# Patient Record
Sex: FEMALE | Race: WHITE | Employment: PART TIME | URBAN - METROPOLITAN AREA
[De-identification: names, ages, dates, MRNs, and addresses within clinical notes are randomized per-mention and may not be internally consistent; named-entity substitution may affect disease eponyms.]

---

## 2017-05-30 ENCOUNTER — OFFICE VISIT (OUTPATIENT)
Dept: INTERNAL MEDICINE | Age: 32
End: 2017-05-30

## 2017-05-30 VITALS
DIASTOLIC BLOOD PRESSURE: 70 MMHG | BODY MASS INDEX: 20.83 KG/M2 | SYSTOLIC BLOOD PRESSURE: 100 MMHG | HEIGHT: 65 IN | WEIGHT: 125 LBS | HEART RATE: 85 BPM

## 2017-05-30 DIAGNOSIS — N76.6 VULVAR ULCER: Primary | ICD-10-CM

## 2017-05-30 DIAGNOSIS — R30.0 DYSURIA: ICD-10-CM

## 2017-05-30 LAB
BILIRUBIN, POC: NORMAL
BLOOD URINE, POC: NORMAL
CLARITY, POC: NORMAL
COLOR, POC: NORMAL
GLUCOSE URINE, POC: NORMAL
KETONES, POC: NORMAL
LEUKOCYTE EST, POC: NORMAL
NITRITE, POC: NORMAL
PH, POC: 6
PROTEIN, POC: NORMAL
SPECIFIC GRAVITY, POC: 1.01
UROBILINOGEN, POC: NORMAL

## 2017-05-30 PROCEDURE — 81002 URINALYSIS NONAUTO W/O SCOPE: CPT | Performed by: FAMILY MEDICINE

## 2017-05-30 PROCEDURE — 99202 OFFICE O/P NEW SF 15 MIN: CPT | Performed by: FAMILY MEDICINE

## 2017-05-30 ASSESSMENT — PATIENT HEALTH QUESTIONNAIRE - PHQ9
SUM OF ALL RESPONSES TO PHQ QUESTIONS 1-9: 0
1. LITTLE INTEREST OR PLEASURE IN DOING THINGS: 0
2. FEELING DOWN, DEPRESSED OR HOPELESS: 0
SUM OF ALL RESPONSES TO PHQ9 QUESTIONS 1 & 2: 0

## 2017-08-03 ENCOUNTER — HOSPITAL ENCOUNTER (OUTPATIENT)
Age: 32
Setting detail: SPECIMEN
Discharge: HOME | End: 2017-08-03
Payer: COMMERCIAL

## 2017-08-03 ENCOUNTER — OFFICE VISIT (OUTPATIENT)
Dept: INTERNAL MEDICINE | Age: 32
End: 2017-08-03

## 2017-08-03 VITALS
HEART RATE: 88 BPM | HEIGHT: 65 IN | WEIGHT: 121 LBS | BODY MASS INDEX: 20.16 KG/M2 | SYSTOLIC BLOOD PRESSURE: 118 MMHG | DIASTOLIC BLOOD PRESSURE: 60 MMHG

## 2017-08-03 DIAGNOSIS — J06.9 VIRAL URI: Primary | ICD-10-CM

## 2017-08-03 DIAGNOSIS — J02.9 SORE THROAT: ICD-10-CM

## 2017-08-03 DIAGNOSIS — J03.90 TONSILLITIS: ICD-10-CM

## 2017-08-03 LAB — S PYO AG THROAT QL: NORMAL

## 2017-08-03 PROCEDURE — 87880 STREP A ASSAY W/OPTIC: CPT | Performed by: NURSE PRACTITIONER

## 2017-08-03 PROCEDURE — 87070 CULTURE OTHR SPECIMN AEROBIC: CPT

## 2017-08-03 PROCEDURE — 99213 OFFICE O/P EST LOW 20 MIN: CPT | Performed by: NURSE PRACTITIONER

## 2017-08-03 RX ORDER — FLUTICASONE PROPIONATE 50 MCG
2 SPRAY, SUSPENSION (ML) NASAL DAILY
Qty: 1 BOTTLE | Refills: 0 | Status: SHIPPED | OUTPATIENT
Start: 2017-08-03 | End: 2017-09-27 | Stop reason: ALTCHOICE

## 2017-08-03 RX ORDER — CETIRIZINE HYDROCHLORIDE 10 MG/1
10 TABLET ORAL DAILY
Qty: 30 TABLET | Refills: 0 | Status: SHIPPED | OUTPATIENT
Start: 2017-08-03 | End: 2017-09-27 | Stop reason: ALTCHOICE

## 2017-08-03 ASSESSMENT — ENCOUNTER SYMPTOMS
BACK PAIN: 0
PHOTOPHOBIA: 0
VOMITING: 0
CONSTIPATION: 0
CHANGE IN BOWEL HABIT: 0
FACIAL SWEATING: 0
ABDOMINAL DISTENTION: 0
SCALP TENDERNESS: 0
SINUS PRESSURE: 0
NAUSEA: 0
SWOLLEN GLANDS: 1
COUGH: 0
DIARRHEA: 0
EYE DISCHARGE: 0
EYE WATERING: 0
EYE ITCHING: 0
EYE PAIN: 0
WHEEZING: 0
RHINORRHEA: 0
VISUAL CHANGE: 0
BLURRED VISION: 0
EYE REDNESS: 0
SORE THROAT: 1
ABDOMINAL PAIN: 0

## 2017-08-06 LAB — THROAT CULTURE: NORMAL

## 2017-09-27 ENCOUNTER — OFFICE VISIT (OUTPATIENT)
Dept: INTERNAL MEDICINE | Age: 32
End: 2017-09-27

## 2017-09-27 VITALS
WEIGHT: 116.6 LBS | BODY MASS INDEX: 19.43 KG/M2 | DIASTOLIC BLOOD PRESSURE: 70 MMHG | HEART RATE: 89 BPM | SYSTOLIC BLOOD PRESSURE: 100 MMHG | HEIGHT: 65 IN

## 2017-09-27 DIAGNOSIS — Z00.00 ANNUAL PHYSICAL EXAM: Primary | ICD-10-CM

## 2017-09-27 PROCEDURE — 99395 PREV VISIT EST AGE 18-39: CPT | Performed by: FAMILY MEDICINE

## 2017-09-27 ASSESSMENT — ENCOUNTER SYMPTOMS
EYE PAIN: 0
EYE DISCHARGE: 0
EYE ITCHING: 0
CHOKING: 0
CHEST TIGHTNESS: 0
APNEA: 0

## 2018-09-18 ENCOUNTER — OFFICE VISIT (OUTPATIENT)
Dept: INTERNAL MEDICINE | Age: 33
End: 2018-09-18
Payer: COMMERCIAL

## 2018-09-18 VITALS
HEART RATE: 103 BPM | WEIGHT: 118 LBS | SYSTOLIC BLOOD PRESSURE: 126 MMHG | OXYGEN SATURATION: 99 % | DIASTOLIC BLOOD PRESSURE: 75 MMHG | BODY MASS INDEX: 19.94 KG/M2

## 2018-09-18 DIAGNOSIS — Z00.00 ANNUAL PHYSICAL EXAM: Primary | ICD-10-CM

## 2018-09-18 PROCEDURE — 99395 PREV VISIT EST AGE 18-39: CPT | Performed by: FAMILY MEDICINE

## 2018-09-18 ASSESSMENT — PATIENT HEALTH QUESTIONNAIRE - PHQ9
1. LITTLE INTEREST OR PLEASURE IN DOING THINGS: 0
SUM OF ALL RESPONSES TO PHQ QUESTIONS 1-9: 0
2. FEELING DOWN, DEPRESSED OR HOPELESS: 0
SUM OF ALL RESPONSES TO PHQ9 QUESTIONS 1 & 2: 0
SUM OF ALL RESPONSES TO PHQ QUESTIONS 1-9: 0

## 2018-09-18 NOTE — PROGRESS NOTES
Patient: Adriana Lincoln    YOB: 1985    There are no active problems to display for this patient. No Known Allergies    Past Medical History:   Diagnosis Date    Pharyngitis, acute     Thyroid cyst      Past Surgical History:   Procedure Laterality Date    APPENDECTOMY       SECTION N/A     FOOT SURGERY      LEFT    THYROIDECTOMY, PARTIAL  2008    LEFT    WISDOM TOOTH EXTRACTION       Family History   Problem Relation Age of Onset    High Blood Pressure Mother     Other Mother         BRAIN TUMOR - benign     Allergies Father     Kidney Disease Other     High Blood Pressure Other     Heart Disease Other      Social History     Social History    Marital status:      Spouse name: N/A    Number of children: N/A    Years of education: N/A     Occupational History    Not on file. Social History Main Topics    Smoking status: Never Smoker    Smokeless tobacco: Never Used    Alcohol use No    Drug use: No      Comment: ossaional     Sexual activity: Yes     Partners: Male     Other Topics Concern    Not on file     Social History Narrative    No narrative on file     Current Outpatient Prescriptions on File Prior to Visit   Medication Sig Dispense Refill    Multiple Vitamin (MULTI-VITAMIN DAILY PO) Take 1 tablet by mouth daily. No current facility-administered medications on file prior to visit.         BP Readings from Last 4 Encounters:   18 126/75   17 100/70   17 118/60   17 100/70   ]  Wt Readings from Last 4 Encounters:   18 118 lb (53.5 kg)   17 116 lb 9.6 oz (52.9 kg)   17 121 lb (54.9 kg)   17 125 lb (56.7 kg)   ]    No components found for: HBA1C)]  No results found for: CHOL  No results found for: HDL  No components found for: LDL  No components found for: TG]    Chief Complaint   Patient presents with    Annual Exam     insurance  physical, no form or bw required       HPI - Annual Physical Exam      History   Alcohol Use No     History   Smoking Status    Never Smoker   Smokeless Tobacco    Never Used     History   Drug Use No     Comment: ossaional        OBGYN Hx:  No LMP recorded. OB History    Para Term  AB Living   2 2 2     2   SAB TAB Ectopic Molar Multiple Live Births             2      # Outcome Date GA Lbr Duran/2nd Weight Sex Delivery Anes PTL Lv   2 Term            1 Term                 History   Sexual Activity    Sexual activity: Yes    Partners: Male       Review of Systems  Constitutional: Negative for fatigue, fever and sweats. HEENT: Negative for eye discharge and vision loss. Negative for ear drainage, hearing loss and nasal drainage. Respiratory: Negative for cough, dyspnea and wheezing. Cardiovascular:  Negative for chest pain, claudication and irregular heartbeat/palpitations. Physical Exam  Vitals:    18 1010   BP: 126/75   Pulse: 103   SpO2: 99%   Body mass index is 19.94 kg/m². Physical Exam  Constitutional: appears well-developed and well-nourished. No distress. HENT:   Head: Normocephalic and atraumatic. Right Ear: Tympanic membrane, external ear and ear canal normal.   Left Ear: Tympanic membrane, external ear and ear canal normal.   Nose: Nose normal.   Mouth/Throat: Oropharynx is clear and moist. No oropharyngeal exudate. Eyes: Conjunctivae and EOM are normal. Right eye exhibits no discharge. No scleral icterus. Neck: Normal range of motion. Neck supple. Cardiovascular: Normal rate, regular rhythm and normal heart sounds. Pulmonary/Chest: Effort normal and breath sounds normal. No respiratory distress. no wheezes. no rales. Abd: soft NT ND  Lymphadenopathy:      no cervical adenopathy. Skin:  not diaphoretic.    Gait Normal  LE: no edema  Nursing note and vitals reviewed.       Assessment:  Marc Johnson was seen today for annual exam.    Diagnoses and all orders for this visit:    Annual physical exam      Return in

## 2019-01-14 ENCOUNTER — OFFICE VISIT (OUTPATIENT)
Dept: INTERNAL MEDICINE | Age: 34
End: 2019-01-14
Payer: COMMERCIAL

## 2019-01-14 VITALS
SYSTOLIC BLOOD PRESSURE: 110 MMHG | HEART RATE: 84 BPM | DIASTOLIC BLOOD PRESSURE: 68 MMHG | OXYGEN SATURATION: 96 % | BODY MASS INDEX: 21.68 KG/M2 | TEMPERATURE: 98.2 F | WEIGHT: 127 LBS | HEIGHT: 64 IN

## 2019-01-14 DIAGNOSIS — J02.9 PHARYNGITIS, UNSPECIFIED ETIOLOGY: ICD-10-CM

## 2019-01-14 DIAGNOSIS — J02.9 SORE THROAT: Primary | ICD-10-CM

## 2019-01-14 LAB — S PYO AG THROAT QL: NORMAL

## 2019-01-14 PROCEDURE — 87880 STREP A ASSAY W/OPTIC: CPT | Performed by: NURSE PRACTITIONER

## 2019-01-14 PROCEDURE — 99213 OFFICE O/P EST LOW 20 MIN: CPT | Performed by: NURSE PRACTITIONER

## 2019-01-14 RX ORDER — PANTOPRAZOLE SODIUM 40 MG/1
TABLET, DELAYED RELEASE ORAL
Refills: 0 | COMMUNITY
Start: 2018-12-21 | End: 2021-12-09

## 2019-01-14 ASSESSMENT — ENCOUNTER SYMPTOMS
RHINORRHEA: 0
SORE THROAT: 1
VOMITING: 0
DIARRHEA: 0
APNEA: 0
SINUS PRESSURE: 0
NAUSEA: 0
COUGH: 0
SINUS PAIN: 0

## 2019-01-15 ENCOUNTER — TELEPHONE (OUTPATIENT)
Dept: INTERNAL MEDICINE | Age: 34
End: 2019-01-15

## 2021-12-09 ENCOUNTER — VIRTUAL VISIT (OUTPATIENT)
Dept: FAMILY MEDICINE CLINIC | Age: 36
End: 2021-12-09
Payer: COMMERCIAL

## 2021-12-09 DIAGNOSIS — B96.89 ACUTE BACTERIAL SINUSITIS: Primary | ICD-10-CM

## 2021-12-09 DIAGNOSIS — J01.90 ACUTE BACTERIAL SINUSITIS: Primary | ICD-10-CM

## 2021-12-09 PROCEDURE — 99213 OFFICE O/P EST LOW 20 MIN: CPT | Performed by: FAMILY MEDICINE

## 2021-12-09 RX ORDER — AMOXICILLIN AND CLAVULANATE POTASSIUM 875; 125 MG/1; MG/1
1 TABLET, FILM COATED ORAL 2 TIMES DAILY
Qty: 20 TABLET | Refills: 0 | Status: SHIPPED | OUTPATIENT
Start: 2021-12-09 | End: 2021-12-19

## 2021-12-09 RX ORDER — FLUTICASONE PROPIONATE 50 MCG
2 SPRAY, SUSPENSION (ML) NASAL DAILY
Qty: 48 G | Refills: 1 | Status: SHIPPED | OUTPATIENT
Start: 2021-12-09

## 2021-12-09 RX ORDER — IBUPROFEN 200 MG
200 TABLET ORAL EVERY 6 HOURS PRN
COMMUNITY

## 2021-12-09 SDOH — ECONOMIC STABILITY: FOOD INSECURITY: WITHIN THE PAST 12 MONTHS, YOU WORRIED THAT YOUR FOOD WOULD RUN OUT BEFORE YOU GOT MONEY TO BUY MORE.: NEVER TRUE

## 2021-12-09 SDOH — ECONOMIC STABILITY: FOOD INSECURITY: WITHIN THE PAST 12 MONTHS, THE FOOD YOU BOUGHT JUST DIDN'T LAST AND YOU DIDN'T HAVE MONEY TO GET MORE.: NEVER TRUE

## 2021-12-09 ASSESSMENT — SOCIAL DETERMINANTS OF HEALTH (SDOH): HOW HARD IS IT FOR YOU TO PAY FOR THE VERY BASICS LIKE FOOD, HOUSING, MEDICAL CARE, AND HEATING?: NOT HARD AT ALL

## 2021-12-09 ASSESSMENT — ENCOUNTER SYMPTOMS
SINUS PAIN: 1
CONSTIPATION: 0
ABDOMINAL PAIN: 0
SHORTNESS OF BREATH: 0
SINUS PRESSURE: 1
SORE THROAT: 0
DIARRHEA: 0
WHEEZING: 0
RHINORRHEA: 0
COUGH: 0

## 2021-12-09 ASSESSMENT — PATIENT HEALTH QUESTIONNAIRE - PHQ9
SUM OF ALL RESPONSES TO PHQ QUESTIONS 1-9: 0
SUM OF ALL RESPONSES TO PHQ QUESTIONS 1-9: 0
SUM OF ALL RESPONSES TO PHQ9 QUESTIONS 1 & 2: 0
2. FEELING DOWN, DEPRESSED OR HOPELESS: 0
1. LITTLE INTEREST OR PLEASURE IN DOING THINGS: 0
SUM OF ALL RESPONSES TO PHQ QUESTIONS 1-9: 0

## 2021-12-09 NOTE — PROGRESS NOTES
[facility]  Virtual Visit  [address] [facility] [address] [facility] PRIMARY CARE  [id number]  Dept: [phone]  Dept Fax: : [phone]     Due to COVID 23 outbreak, patient's office visit was converted to a virtual visit. Patient was contacted and agreed to proceed with a virtual visit via Doxy. me  The risks and benefits of converting to a virtual visit were discussed in light of the current infectious disease epidemic. Patient also understood that insurance coverage and co-pays are up to their individual insurance plans. Chief Complaint  Chief Complaint   Patient presents with    Positive For Covid-19     x8 days, dx 21, congestion, sinus pressure, no smell or taste, cough       HPI:  31 y. o.female who presents for the following:      Started symptoms  and pos for COVID ; still unable to taste/smell, still has congestion, face pressure; temp to 100.4 last week but not lately; taking tylenol, ibuprofen, claritin, afrin twice, saline nasal spray, gamaliel pot; Hx of SVT and gets intermittent palpitations so worries about otc meds or decongestants    Review of Systems   Constitutional: Negative for chills and fever. HENT: Positive for sinus pressure and sinus pain. Negative for congestion, rhinorrhea and sore throat. Respiratory: Negative for cough, shortness of breath and wheezing. Gastrointestinal: Negative for abdominal pain, constipation and diarrhea. Endocrine: Negative for polydipsia and polyuria. Genitourinary: Negative for dysuria, frequency and urgency. Neurological: Negative for syncope, light-headedness, numbness and headaches. Psychiatric/Behavioral: Negative for sleep disturbance. The patient is not nervous/anxious.         Past Medical History:   Diagnosis Date    Pharyngitis, acute     Thyroid cyst      Past Surgical History:   Procedure Laterality Date    APPENDECTOMY       SECTION N/A 02/12    FOOT SURGERY      LEFT    THYROIDECTOMY, PARTIAL  12/2008    LEFT    WISDOM TOOTH EXTRACTION       Social History     Socioeconomic History    Marital status:      Spouse name: Not on file    Number of children: Not on file    Years of education: Not on file    Highest education level: Not on file   Occupational History    Not on file   Tobacco Use    Smoking status: Never Smoker    Smokeless tobacco: Never Used   Substance and Sexual Activity    Alcohol use: No    Drug use: No     Comment: ossaional     Sexual activity: Yes     Partners: Male   Other Topics Concern    Not on file   Social History Narrative    Not on file     Social Determinants of Health     Financial Resource Strain: Low Risk     Difficulty of Paying Living Expenses: Not hard at all   Food Insecurity: No Food Insecurity    Worried About [employer] in the Last Year: Never true    [address] [place] in the Last Year: Never true   Transportation Needs:     Lack of Transportation (Medical): Not on file    Lack of Transportation (Non-Medical):  Not on file   Physical Activity:     Days of Exercise per Week: Not on file    Minutes of Exercise per Session: Not on file   Stress:     Feeling of Stress : Not on file   Social Connections:     Frequency of Communication with Friends and Family: Not on file    Frequency of Social Gatherings with Friends and Family: Not on file    Attends Protestant Services: Not on file    Active Member of Clubs or Organizations: Not on file    Attends Club or Organization Meetings: Not on file    Marital Status: Not on file   Intimate Partner Violence:     Fear of Current or Ex-Partner: Not on file    Emotionally Abused: Not on file    Physically Abused: Not on file    Sexually Abused: Not on file   Housing Stability:     Unable to Pay for Housing in the Last Year: Not on file    Number of [place] in the Last Year: Not on file    Unstable Housing in the Last Year: Not on file     Family History   Problem Relation Age of Onset    High Blood Pressure Mother     Other Mother         BRAIN TUMOR - benign     Allergies Father     Kidney Disease Other     High Blood Pressure Other     Heart Disease Other       No Known Allergies  Current Outpatient Medications   Medication Sig Dispense Refill    Multiple Vitamin (MULTIVITAMINS PO) Take by mouth      Acetaminophen (TYLENOL 8 HOUR PO) Take by mouth      ibuprofen (ADVIL;MOTRIN) 200 MG tablet Take 200 mg by mouth every 6 hours as needed for Pain      fluticasone (FLONASE) 50 MCG/ACT nasal spray 2 sprays by Each Nostril route daily 48 g 1    amoxicillin-clavulanate (AUGMENTIN) 875-125 MG per tablet Take 1 tablet by mouth 2 times daily for 10 days 20 tablet 0     No current facility-administered medications for this visit. Physical exam:  Physical Exam  Constitutional:       General: She is not in acute distress. Appearance: Normal appearance. She is not ill-appearing. HENT:      Head: Normocephalic and atraumatic. Pulmonary:      Effort: Pulmonary effort is normal. No respiratory distress. Musculoskeletal:      Cervical back: Normal range of motion. Neurological:      Mental Status: She is alert. Assessment/Plan:  31 y.o. female here mainly for Sinus pain:  - limited on options; discussed warnings on afrin; starting nasal steroid; provided augmentin to use later if pain and pressure increase; her symptoms are pretty focal on the maxillary area b/l     Diagnosis Orders   1. Acute bacterial sinusitis  fluticasone (FLONASE) 50 MCG/ACT nasal spray    amoxicillin-clavulanate (AUGMENTIN) 875-125 MG per tablet        Return if symptoms worsen or fail to improve.     Shon Fabian MD       Pursuant to the emergency declaration under the [facility], [address] [place] [address] and the [employer] General Act, this Virtual Visit was conducted, with patient's consent, to reduce the patient's risk of exposure to COVID-19 and provide continuity of care for an established patient. Services were provided through a video synchronous discussion virtually to substitute for in-person clinic visit.

## 2023-12-18 PROBLEM — I47.11 INAPPROPRIATE SINUS TACHYCARDIA (CMS-HCC): Status: ACTIVE | Noted: 2023-12-18

## 2023-12-18 PROBLEM — I47.10 SUPRAVENTRICULAR TACHYCARDIA (CMS-HCC): Status: ACTIVE | Noted: 2023-12-18

## 2023-12-18 RX ORDER — LANOLIN ALCOHOL/MO/W.PET/CERES
400 CREAM (GRAM) TOPICAL DAILY
COMMUNITY
Start: 2022-09-16 | End: 2024-03-01 | Stop reason: WASHOUT

## 2024-01-09 ENCOUNTER — ANCILLARY PROCEDURE (OUTPATIENT)
Dept: RADIOLOGY | Facility: CLINIC | Age: 39
End: 2024-01-09
Payer: COMMERCIAL

## 2024-01-09 DIAGNOSIS — M25.532 WRIST PAIN, LEFT: ICD-10-CM

## 2024-01-09 PROCEDURE — 73110 X-RAY EXAM OF WRIST: CPT | Mod: LEFT SIDE | Performed by: RADIOLOGY

## 2024-01-09 PROCEDURE — 73110 X-RAY EXAM OF WRIST: CPT | Mod: LT

## 2024-01-19 ENCOUNTER — APPOINTMENT (OUTPATIENT)
Dept: CARDIOLOGY | Facility: CLINIC | Age: 39
End: 2024-01-19
Payer: COMMERCIAL

## 2024-03-01 ENCOUNTER — OFFICE VISIT (OUTPATIENT)
Dept: CARDIOLOGY | Facility: CLINIC | Age: 39
End: 2024-03-01
Payer: COMMERCIAL

## 2024-03-01 VITALS
DIASTOLIC BLOOD PRESSURE: 78 MMHG | SYSTOLIC BLOOD PRESSURE: 124 MMHG | WEIGHT: 128 LBS | HEIGHT: 64 IN | BODY MASS INDEX: 21.85 KG/M2 | HEART RATE: 75 BPM

## 2024-03-01 DIAGNOSIS — Z71.89 ENCOUNTER FOR MEDICATION REVIEW AND COUNSELING: ICD-10-CM

## 2024-03-01 DIAGNOSIS — I47.10 SUPRAVENTRICULAR TACHYCARDIA (CMS-HCC): Primary | ICD-10-CM

## 2024-03-01 DIAGNOSIS — I47.11 INAPPROPRIATE SINUS TACHYCARDIA (CMS-HCC): ICD-10-CM

## 2024-03-01 DIAGNOSIS — Z71.89 ENCOUNTER TO DISCUSS TREATMENT OPTIONS: ICD-10-CM

## 2024-03-01 DIAGNOSIS — Z78.9 NEVER SMOKED CIGARETTES: ICD-10-CM

## 2024-03-01 PROBLEM — N97.9 FEMALE INFERTILITY: Status: ACTIVE | Noted: 2024-03-01

## 2024-03-01 PROCEDURE — 3008F BODY MASS INDEX DOCD: CPT | Performed by: INTERNAL MEDICINE

## 2024-03-01 PROCEDURE — 99213 OFFICE O/P EST LOW 20 MIN: CPT | Performed by: INTERNAL MEDICINE

## 2024-03-01 PROCEDURE — 93000 ELECTROCARDIOGRAM COMPLETE: CPT | Performed by: INTERNAL MEDICINE

## 2024-03-01 PROCEDURE — 1036F TOBACCO NON-USER: CPT | Performed by: INTERNAL MEDICINE

## 2024-03-01 RX ORDER — BISMUTH SUBSALICYLATE 262 MG
1 TABLET,CHEWABLE ORAL DAILY
COMMUNITY

## 2024-03-01 ASSESSMENT — ENCOUNTER SYMPTOMS
PALPITATIONS: 1
DYSPNEA ON EXERTION: 0

## 2024-03-01 NOTE — PROGRESS NOTES
"Chief Complaint:   Follow-up (Patient presented today for a 1 year follow up./EKG done in office today./)     History Of Present Illness:    Gillian Mcdonald is a 38 y.o. female presenting with followup.    She rarely has palpitation.   She denies any lightheadedness, near syncope, syncope, or dizziness.,         Last Recorded Vitals:  Vitals:    03/01/24 1533   BP: 124/78   BP Location: Left arm   Patient Position: Sitting   BP Cuff Size: Small adult   Pulse: 75   Weight: 58.1 kg (128 lb)   Height: 1.613 m (5' 3.5\")       Past Medical History:  See List     Past Surgical History:  See List       Social History:  She reports that she has never smoked. She has never used smokeless tobacco. She reports current alcohol use. She reports that she does not use drugs.    Family History:  Family History   Problem Relation Name Age of Onset    Hypertension Mother      Hypertension Maternal Grandfather      Other (atrial septal defect) Son          Allergies:  Patient has no known allergies.    Outpatient Medications:  Current Outpatient Medications   Medication Instructions    multivitamin tablet 1 tablet, oral, Daily   Review of Systems   Cardiovascular:  Positive for palpitations. Negative for chest pain and dyspnea on exertion.   All other systems reviewed and are negative.    Physical Exam:  Constitutional:       General: Awake.      Appearance: Normal and healthy appearance. Well-developed and not in distress.   Neck:      Vascular: No JVR. JVD normal.   Pulmonary:      Effort: Pulmonary effort is normal.      Breath sounds: Normal breath sounds. No wheezing. No rhonchi. No rales.   Chest:      Chest wall: Not tender to palpatation.   Cardiovascular:      PMI at left midclavicular line. Normal rate. Regular rhythm. Normal S1. Normal S2.       Murmurs: There is no murmur.      No gallop.  No click. No rub.   Pulses:     Intact distal pulses.   Edema:     Peripheral edema absent.   Abdominal:      Tenderness: " "There is no abdominal tenderness.   Musculoskeletal: Normal range of motion.         General: No tenderness. Skin:     General: Skin is warm and dry.   Neurological:      General: No focal deficit present.      Mental Status: Alert and oriented to person, place and time.            Last Labs:    No results found for: \"WBC\", \"HGB\", \"HCT\", \"MCV\", \"PLT\"   No results found for: \"GLUCOSE\", \"CALCIUM\", \"NA\", \"K\", \"CO2\", \"CL\", \"BUN\", \"CREATININE\"   No results found for: \"INR\", \"PROTIME\"         Last Cardiology Tests:  ECG:  Today. NSR. Normal axis. Qtc 410 ms        Lab review: I have personally reviewed the laboratory result(s) see above    Assessment/Plan   Diagnoses and all orders for this visit:  Supraventricular tachycardia  Inappropriate sinus tachycardia  Never smoked cigarettes  Encounter for medication review and counseling  Encounter to discuss treatment options  BMI 22.0-22.9, adult        Yue Trujillo RN    Inappropriate sinus tachycardia. Chronic. Stable. Reviewed meds. Continue meds. Refills. If increased symptoms, would obtain 24-hour monitor.   History of exertional dyspnea. Resolved. Normal LV function and stress echo in remote past. Normal MRI. No further evaluation indicated presently.  s/p partial thyroidectomy  No caffeine intake  FMH of ASD, CAD, and AF. No new family medical history        AHA recommendations for exercise, diet, and behavioral modification reviewed with pt.     The patient and I discussed the mechanism of arrhythmia, ECG, inappropriate sinus tachycardia, historical triggers, aerobic training, indications for and types of medications, discussion if and what medication refills needed, increased aerobic activity, increase fluid intake, avoid dehydration, treatment options, risks, benefits, and imponderables. American Heart Association lifestyle changes and behavioral modification discussed. All questions answered in detail. Counseling over 50% visit regarding above. Patient " appreciative of care.

## 2024-06-19 ENCOUNTER — APPOINTMENT (OUTPATIENT)
Dept: INTEGRATIVE MEDICINE | Facility: CLINIC | Age: 39
End: 2024-06-19
Payer: COMMERCIAL

## 2024-06-19 VITALS
SYSTOLIC BLOOD PRESSURE: 125 MMHG | OXYGEN SATURATION: 99 % | WEIGHT: 123 LBS | HEIGHT: 63 IN | DIASTOLIC BLOOD PRESSURE: 82 MMHG | HEART RATE: 71 BPM | BODY MASS INDEX: 21.79 KG/M2

## 2024-06-19 DIAGNOSIS — I47.11 INAPPROPRIATE SINUS TACHYCARDIA (CMS-HCC): Primary | ICD-10-CM

## 2024-06-19 DIAGNOSIS — R53.83 OTHER FATIGUE: ICD-10-CM

## 2024-06-19 DIAGNOSIS — E89.0 H/O PARTIAL THYROIDECTOMY: ICD-10-CM

## 2024-06-19 DIAGNOSIS — N92.0 MENORRHAGIA WITH REGULAR CYCLE: ICD-10-CM

## 2024-06-19 DIAGNOSIS — N97.9 FEMALE INFERTILITY: ICD-10-CM

## 2024-06-19 PROCEDURE — 99205 OFFICE O/P NEW HI 60 MIN: CPT | Performed by: INTERNAL MEDICINE

## 2024-06-19 ASSESSMENT — ENCOUNTER SYMPTOMS
ARTHRALGIAS: 0
APPETITE CHANGE: 0
WEAKNESS: 0
FEVER: 0
FATIGUE: 1
HEADACHES: 1
DYSURIA: 0
CONSTIPATION: 0
SHORTNESS OF BREATH: 0
MYALGIAS: 0
LIGHT-HEADEDNESS: 1
BACK PAIN: 0
COUGH: 0
NERVOUS/ANXIOUS: 0
DIFFICULTY URINATING: 0
DIARRHEA: 0
SLEEP DISTURBANCE: 1

## 2024-06-19 NOTE — PROGRESS NOTES
Integrative Medicine Visit:     Subjective   Patient ID: Gillian Mcdonald is a 39 y.o. female who presents for hormones (Wendi menopause)       HPI  Having super heavy periods.  She is trying to determine if she is starting menopause. She has been to her gyn and they recommend birth control pills.  Was on ocp years ago 13 years ago for menorrhagia.  Since birth of her daughter she has had very heavy periods.  Last period was 13 days ago but still waking up in middle of the ngiht. Feels hot a lot at night.   Periods; super heavy.  Happens every month from 26-30 days (period cycle length).  2nd day very heavy with large clots. Goes throurgh an ultra tampon in an hour and a half., not sure if she is anemic. No labs in years. Takes a multiple vitamin with iron.     Gets irritable when she has her periods.    No constipation or diarrhea.   Used to be on meds for headachaes- notices it is from dehydration or lack of sleep. Mostly gets them around her period.     Takes a magnesium / vitamin c suppelenmt from plexus  Takes a prebiotic supplement also.   Heart palpitations- followed by cardiologist.  Suggested to exercise daily.   CONCERNS:  wants to know if she is going through menopause. Does not want to go through birth control.     PMH:  heart palpitations- sees a cardiologist- not on meds for this.  Advised to exercise daily.   Thyroid nodule- small removal of part of the thyroid. Yearly thyroid labs.   Hx of iron deficiency anemia as a child- on supplements.   Infertility    [facility]:  hypertension, high cholesterol. Mom with thyroid cancer.   Grandpa with pancreatic cancer.    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 Two kids . ( has had vasectomy and they are no longer trying to have additional children) kids ages 12 , 7.     NUTRITION: egg with avocado and toast water  Tacos- beef lettuce, tomatoes, sour cream, guacomole  Snacks: not sure; almonds, yogurt, cucumbers, peppers,  "chips, lunch meat  Bagel for breakfast laughing cow cheese  Lunch: leftovers- egg roll in a bowl, cabbage, ground beef, soy sauce and rice noodles.     Smoking:  non-smoker    Alcohol use:   maybe has 1-2 drinks per week.     Exercise:   20-30 minutes of spin bike five days per week or elliptical  or walking;  does some weights but sporadically-     SLEEP: wakes up at least once per night. Has night sweats. No hot flashes during the day.     STRESS MANAGEMENT: gardening, go on the elliptical, likes to read  SUPPORT: , good friends that she talks to. Sister    Review of Systems   Constitutional:  Positive for fatigue. Negative for appetite change and fever.   HENT:  Negative for congestion and hearing loss.    Eyes:  Negative for visual disturbance.   Respiratory:  Negative for cough and shortness of breath.    Cardiovascular:  Negative for chest pain and leg swelling.   Gastrointestinal:  Negative for constipation and diarrhea.   Genitourinary:  Negative for difficulty urinating, dysuria and urgency.   Musculoskeletal:  Negative for arthralgias, back pain and myalgias.   Skin:  Negative for rash.   Neurological:  Positive for light-headedness (when she stands sometimes.) and headaches. Negative for weakness.   Psychiatric/Behavioral:  Positive for sleep disturbance. Negative for behavioral problems. The patient is not nervous/anxious.             Pain:    Objective   /82 (BP Location: Left arm, Patient Position: Sitting, BP Cuff Size: Adult)   Pulse 71   Ht 1.6 m (5' 3\")   Wt 55.8 kg (123 lb)   SpO2 99%   BMI 21.79 kg/m²       Physical Exam  HENT:      Head: Normocephalic and atraumatic.      Mouth/Throat:      Mouth: Mucous membranes are moist.   Cardiovascular:      Rate and Rhythm: Normal rate.   Pulmonary:      Effort: Pulmonary effort is normal. No respiratory distress.   Musculoskeletal:         General: No swelling or deformity.      Cervical back: Normal range of motion.   Skin:     " General: Skin is dry.      Findings: No rash.   Neurological:      General: No focal deficit present.      Mental Status: She is alert and oriented to person, place, and time.   Psychiatric:      Comments: Normal affect                      Assessment/Plan     Problem List Items Addressed This Visit             ICD-10-CM    Inappropriate sinus tachycardia (CMS-HCC) - Primary I47.11    Relevant Orders    Thyroid Stimulating Hormone    Thyroxine, Free    Female infertility N97.9    Relevant Orders    Celiac Panel    Referral to [facility]     Other Visit Diagnoses         Codes    H/O partial thyroidectomy     E89.0    Relevant Orders    Thyroid Stimulating Hormone    Thyroxine, Free    Iodine, Urine    Other fatigue     R53.83    Relevant Orders    Iron and TIBC    Ferritin    CBC and Auto Differential    Celiac Panel    Vitamin B12    Referral to [facility]    Menorrhagia with regular cycle     N92.0    Relevant Orders    Iron and TIBC    Ferritin    CBC and Auto Differential    Thyroid Stimulating Hormone    Thyroxine, Free    Vitamin B12    Iodine, Urine    Referral to [facility]          Patient with heavy somewhat uncomfortable periods which have worsened since birth of second child. Pelvic ultrasound without fibroid from 3 years ago. Heavy blood clots. Hx of partial thyroidectomy. Suggest recheck labs.  May need to see gyn to make sure no new fibroids. Discussed cycle of estrogen/ progesterone and how higher fiber diet can help balance out hormones. See patient instructions for details. Limit dairy in diet which is exogenous source of estrogen/ progesterone.   Consider acupucnture with Dr. Valdemar Dorsey for irregular periods.   Screen for celiac given hx of infertility.   See patient instructions.   Recommend Follow up in : 2 months    Corrie Ponce MD PhD    Time Spent  Prep time on day of patient encounter: 5 minutes  Time spent directly with patient, family or caregiver: 55  minutes  Additional Time Spent on Patient Care Activities: 0 minutes  Documentation Time: 5 minutes  Other Time Spent: 0 minutes  Total: 65 minutes

## 2024-06-19 NOTE — PATIENT INSTRUCTIONS
See if reducing alcohol helps with night sweats and bleeding.   From your diet, strive to get 30 grams of fiber per day.   Start to incorporate ground flax seed.   Use Flax eggs instead of regular eggs for baking. 1 tablespoon of ground flax seed mixed with 3 tablespoons of warm water. Set aside for ten minutes then use as an egg in baking.    Incorporate soy products may help.   Start to eat more beans.   Recommend taking vitamin D 2000 International Units daily including what is in your multiple. You need about 3100 units of vitamin d daily to maintain optimal levels. Recommend check level in the winter on the supplement.   Reduce eggs and dairy in the diet.   Consider seeing Dr. More mcdaniel.   Jason Berry supplement by parvin. Could try this for six months to see this helps your periods.   Skip your MVI for three days and then get the labs.   Strive to exercise 30 minutes daily where you heart rate goes above 100 and try to do some strength each week.   Check iodine level in your mvi.   Follow up in 2 months.

## 2024-06-24 ENCOUNTER — LAB (OUTPATIENT)
Dept: LAB | Facility: LAB | Age: 39
End: 2024-06-24
Payer: COMMERCIAL

## 2024-06-24 DIAGNOSIS — R53.83 OTHER FATIGUE: ICD-10-CM

## 2024-06-24 DIAGNOSIS — N92.0 MENORRHAGIA WITH REGULAR CYCLE: ICD-10-CM

## 2024-06-24 DIAGNOSIS — I47.11 INAPPROPRIATE SINUS TACHYCARDIA (CMS-HCC): ICD-10-CM

## 2024-06-24 DIAGNOSIS — N97.9 FEMALE INFERTILITY: ICD-10-CM

## 2024-06-24 DIAGNOSIS — E89.0 H/O PARTIAL THYROIDECTOMY: ICD-10-CM

## 2024-06-24 LAB
BASOPHILS # BLD AUTO: 0.05 X10*3/UL (ref 0–0.1)
BASOPHILS NFR BLD AUTO: 0.9 %
EOSINOPHIL # BLD AUTO: 0.09 X10*3/UL (ref 0–0.7)
EOSINOPHIL NFR BLD AUTO: 1.6 %
ERYTHROCYTE [DISTWIDTH] IN BLOOD BY AUTOMATED COUNT: 13.1 % (ref 11.5–14.5)
FERRITIN SERPL-MCNC: 29 NG/ML (ref 8–150)
HCT VFR BLD AUTO: 41.2 % (ref 36–46)
HGB BLD-MCNC: 13.9 G/DL (ref 12–16)
IMM GRANULOCYTES # BLD AUTO: 0.01 X10*3/UL (ref 0–0.7)
IMM GRANULOCYTES NFR BLD AUTO: 0.2 % (ref 0–0.9)
IRON SATN MFR SERPL: 12 % (ref 25–45)
IRON SERPL-MCNC: 55 UG/DL (ref 35–150)
LYMPHOCYTES # BLD AUTO: 1.88 X10*3/UL (ref 1.2–4.8)
LYMPHOCYTES NFR BLD AUTO: 33.3 %
MCH RBC QN AUTO: 29.1 PG (ref 26–34)
MCHC RBC AUTO-ENTMCNC: 33.7 G/DL (ref 32–36)
MCV RBC AUTO: 86 FL (ref 80–100)
MONOCYTES # BLD AUTO: 0.43 X10*3/UL (ref 0.1–1)
MONOCYTES NFR BLD AUTO: 7.6 %
NEUTROPHILS # BLD AUTO: 3.18 X10*3/UL (ref 1.2–7.7)
NEUTROPHILS NFR BLD AUTO: 56.4 %
NRBC BLD-RTO: 0 /100 WBCS (ref 0–0)
PLATELET # BLD AUTO: 226 X10*3/UL (ref 150–450)
RBC # BLD AUTO: 4.78 X10*6/UL (ref 4–5.2)
T4 FREE SERPL-MCNC: 0.85 NG/DL (ref 0.61–1.12)
TIBC SERPL-MCNC: 443 UG/DL (ref 240–445)
TSH SERPL-ACNC: 2.11 MIU/L (ref 0.44–3.98)
UIBC SERPL-MCNC: 388 UG/DL (ref 110–370)
VIT B12 SERPL-MCNC: 449 PG/ML (ref 211–911)
WBC # BLD AUTO: 5.6 X10*3/UL (ref 4.4–11.3)

## 2024-06-24 PROCEDURE — 83540 ASSAY OF IRON: CPT

## 2024-06-24 PROCEDURE — 36415 COLL VENOUS BLD VENIPUNCTURE: CPT

## 2024-06-24 PROCEDURE — 84439 ASSAY OF FREE THYROXINE: CPT

## 2024-06-24 PROCEDURE — 83516 IMMUNOASSAY NONANTIBODY: CPT

## 2024-06-24 PROCEDURE — 85025 COMPLETE CBC W/AUTO DIFF WBC: CPT

## 2024-06-24 PROCEDURE — 82728 ASSAY OF FERRITIN: CPT

## 2024-06-24 PROCEDURE — 83018 HEAVY METAL QUAN EACH NES: CPT

## 2024-06-24 PROCEDURE — 82607 VITAMIN B-12: CPT

## 2024-06-24 PROCEDURE — 83550 IRON BINDING TEST: CPT

## 2024-06-24 PROCEDURE — 84443 ASSAY THYROID STIM HORMONE: CPT

## 2024-06-25 LAB
GLIADIN PEPTIDE IGA SER IA-ACNC: <1 U/ML
TTG IGA SER IA-ACNC: <1 U/ML

## 2024-06-26 LAB
GLIADIN PEPTIDE IGG SER IA-ACNC: <0.56 FLU (ref 0–4.99)
TTG IGG SER IA-ACNC: <0.82 FLU (ref 0–4.99)

## 2024-07-01 LAB
COLLECT DURATION TIME SPEC: NORMAL HR
CREAT 24H UR-MRATE: NORMAL MG/D (ref 700–1600)
CREAT UR-MCNC: 13 MG/DL
IODINE 24H UR-MCNC: 28.2 UG/L (ref 26–705)
IODINE 24H UR-MRATE: NORMAL MG/(24.H) (ref 93–1125)
IODINE/CREAT UR: 216.9 UG/G CRT (ref 35–540)
SPECIMEN VOL ?TM UR: NORMAL ML

## 2024-08-14 ENCOUNTER — APPOINTMENT (OUTPATIENT)
Dept: INTEGRATIVE MEDICINE | Facility: CLINIC | Age: 39
End: 2024-08-14
Payer: COMMERCIAL

## 2024-08-14 VITALS
SYSTOLIC BLOOD PRESSURE: 117 MMHG | OXYGEN SATURATION: 99 % | BODY MASS INDEX: 21.62 KG/M2 | WEIGHT: 122 LBS | DIASTOLIC BLOOD PRESSURE: 76 MMHG | HEART RATE: 91 BPM | HEIGHT: 63 IN

## 2024-08-14 DIAGNOSIS — N92.0 MENORRHAGIA WITH REGULAR CYCLE: Primary | ICD-10-CM

## 2024-08-14 PROCEDURE — 99214 OFFICE O/P EST MOD 30 MIN: CPT | Performed by: INTERNAL MEDICINE

## 2024-08-14 ASSESSMENT — ENCOUNTER SYMPTOMS
HEADACHES: 1
FEVER: 0
SHORTNESS OF BREATH: 0
ARTHRALGIAS: 0
FATIGUE: 1
LIGHT-HEADEDNESS: 1
APPETITE CHANGE: 0
BACK PAIN: 0
DIFFICULTY URINATING: 0
ABDOMINAL DISTENTION: 1
MYALGIAS: 0
WEAKNESS: 0
SLEEP DISTURBANCE: 1
DIARRHEA: 0
CONSTIPATION: 0
NERVOUS/ANXIOUS: 0
COUGH: 0
DYSURIA: 0

## 2024-08-14 NOTE — PATIENT INSTRUCTIONS
Keep working on exercise. Perhaps incorporate strength. More frequently if you can.    Check your protein powder, suggest plant based.   Start the vitamin D 2000 International Units daily  Continue the same supplements.  Try tofu.   Follow up in 3 months.   Corrie Ponce MD PhD

## 2024-08-14 NOTE — PROGRESS NOTES
Integrative Medicine Follow-up Visit :     Subjective   Patient ID: Gillian Mcdonald is a 39 y.o. female who presents for Follow-up       HPI  She is doing better with incorporating more fiber. Has tried to add flax / oatmeal but getting bloated.  Having soy milk with fruit, raspberries, blueberries. Getting apples.   When she was first started on more fiber, she flet like her energy is improved.   Busy but energy is better.   Goes to bed around 9:30 and wakes at 5:15 am. Hard to go to bed earlier.     Started the vitamin b12 in the morning.   Last month noticed that her period was a bit lighter. This month her period was heavy but not as bad. Did not last as long.   Takes iron three days per week.     Incorporating more beans and lentils.    Exercising has been regular but she finds it hard to keep doing it. Spin bike or elliptical. Doing more weights with squats and band.     Night sweats are perhaps better.  Not getting lightheadedness.  Taking the iron.   Held off on getting acupuncture because wanted to see if lifestyle helped.            Pain:no    Review of Systems   Constitutional:  Positive for fatigue. Negative for appetite change and fever.   HENT:  Negative for congestion and hearing loss.    Eyes:  Negative for visual disturbance.   Respiratory:  Negative for cough and shortness of breath.    Cardiovascular:  Negative for chest pain and leg swelling.   Gastrointestinal:  Positive for abdominal distention (around the period time). Negative for constipation and diarrhea.   Genitourinary:  Negative for difficulty urinating, dysuria and urgency.   Musculoskeletal:  Negative for arthralgias, back pain and myalgias.   Skin:  Negative for rash.   Neurological:  Positive for light-headedness (when she stands sometimes.) and headaches. Negative for weakness.   Psychiatric/Behavioral:  Positive for sleep disturbance. Negative for behavioral problems. The patient is not nervous/anxious.            "        Objective   /76 (BP Location: Left arm, Patient Position: Sitting, BP Cuff Size: Small adult)   Pulse 91   Ht 1.6 m (5' 3\")   Wt 55.3 kg (122 lb)   SpO2 99%   BMI 21.61 kg/m²     Physical Exam  HENT:      Head: Normocephalic and atraumatic.      Mouth/Throat:      Mouth: Mucous membranes are moist.   Cardiovascular:      Rate and Rhythm: Normal rate.   Pulmonary:      Effort: Pulmonary effort is normal. No respiratory distress.   Musculoskeletal:         General: No swelling or deformity.      Cervical back: Normal range of motion.   Skin:     General: Skin is dry.      Findings: No rash.   Neurological:      General: No focal deficit present.      Mental Status: She is alert and oriented to person, place, and time.   Psychiatric:      Comments: Normal affect                      Assessment/Plan     Problem List Items Addressed This Visit             ICD-10-CM    Menorrhagia with regular cycle - Primary N92.0     Improving. Continue lifestyle treatments. Follow up in 3months.               Recommend Follow up in : 3 mon ths    Corrie Ponce MD PhD    Time Spent  Prep time on day of patient encounter: 5 minutes  Time spent directly with patient, family or caregiver: 24 minutes  Additional Time Spent on Patient Care Activities: 0 minutes  Documentation Time: 5 minutes  Other Time Spent: 0 minutes  Total: 34 minutes                            "

## 2024-11-04 ENCOUNTER — APPOINTMENT (OUTPATIENT)
Dept: INTEGRATIVE MEDICINE | Facility: CLINIC | Age: 39
End: 2024-11-04
Payer: COMMERCIAL

## 2024-11-04 DIAGNOSIS — E55.9 VITAMIN D DEFICIENCY: ICD-10-CM

## 2024-11-04 DIAGNOSIS — R53.83 FATIGUE, UNSPECIFIED TYPE: ICD-10-CM

## 2024-11-04 DIAGNOSIS — N92.0 MENORRHAGIA WITH REGULAR CYCLE: Primary | ICD-10-CM

## 2024-11-04 DIAGNOSIS — E04.2 NONTOXIC MULTINODULAR GOITER: ICD-10-CM

## 2024-11-04 PROCEDURE — 99214 OFFICE O/P EST MOD 30 MIN: CPT | Performed by: INTERNAL MEDICINE

## 2024-11-04 RX ORDER — TRANEXAMIC ACID 650 MG/1
1300 TABLET ORAL 3 TIMES DAILY
COMMUNITY
Start: 2024-09-09

## 2024-11-04 ASSESSMENT — ENCOUNTER SYMPTOMS
FEVER: 0
WEAKNESS: 0
DIFFICULTY URINATING: 0
CONSTIPATION: 0
SLEEP DISTURBANCE: 1
APPETITE CHANGE: 0
ABDOMINAL DISTENTION: 1
BACK PAIN: 0
MYALGIAS: 0
DYSURIA: 0
COUGH: 0
SHORTNESS OF BREATH: 0
HEADACHES: 1
DIARRHEA: 0
NERVOUS/ANXIOUS: 0
ARTHRALGIAS: 0
LIGHT-HEADEDNESS: 1
FATIGUE: 1

## 2024-11-04 NOTE — PROGRESS NOTES
Integrative Medicine Follow-up Visit :     Subjective   Patient ID: Gillian Mcdonald is a 39 y.o. female who presents for Fatigue       Fatigue  Associated symptoms include fatigue and headaches. Pertinent negatives include no arthralgias, chest pain, congestion, coughing, fever, myalgias, rash or weakness.     Doing well.  She is trying to work on more fiber in her diet. She is eating oats for breakfast with flax seed and fruit- berries. Increased her fruit.   She is putting chickpeas on her salad.    Makes a cabbage dish with soybeans.  She puts beans on her tacos.   Taking iron. Not constipated. Takes it 3 x times.   Bloating has improved around the time of her period.  Continues to avoid dairy in her diet.   She started taking the vitamin d. She is taking the chaste berry and the b12. And the iodine. She sees a decrease in her period after starting the iodine by about half.   Getting some pistachio and almond with nuts. Eats some almonds. Likes pumpkin seeds too on salads.   She is working on exercise.  She feels a bit more energetic. She is tired here or there. Not as tired.   She tried tofu a couple times.  She liked it.       She stopped using whey protein powder. She is not using any now.   Pain:less painful periods.   Less episodes of fast heart rate.     Breakfast she had egg whites with zuchini and peppers.  Blueberries   Salad for lunch with vegetables and chickpeas; raspberries.   Dinner- cabbage salad and pork chops  Still struggles with the fruit part.     Review of Systems   Constitutional:  Positive for fatigue. Negative for appetite change and fever.   HENT:  Negative for congestion and hearing loss.    Eyes:  Negative for visual disturbance.   Respiratory:  Negative for cough and shortness of breath.    Cardiovascular:  Negative for chest pain and leg swelling.   Gastrointestinal:  Positive for abdominal distention (around the period time). Negative for constipation and diarrhea.    Genitourinary:  Negative for difficulty urinating, dysuria and urgency.   Musculoskeletal:  Negative for arthralgias, back pain and myalgias.   Skin:  Negative for rash.   Neurological:  Positive for light-headedness (when she stands sometimes.) and headaches. Negative for weakness.   Psychiatric/Behavioral:  Positive for sleep disturbance. Negative for behavioral problems. The patient is not nervous/anxious.                   Objective   There were no vitals taken for this visit.    Physical Exam  Constitutional:       General: She is not in acute distress.     Appearance: She is not ill-appearing, toxic-appearing or diaphoretic.   Pulmonary:      Effort: Pulmonary effort is normal. No respiratory distress.   Musculoskeletal:         General: No deformity.      Cervical back: Normal range of motion.      Comments: Upper extremities normal range of motion grossly   Skin:     Coloration: Skin is not jaundiced or pale.      Findings: No rash.   Psychiatric:         Mood and Affect: Mood normal.         Behavior: Behavior normal.                      Assessment/Plan     Problem List Items Addressed This Visit             ICD-10-CM    Nontoxic multinodular goiter E04.2    Relevant Orders    Iodine, Urine    Menorrhagia with regular cycle - Primary N92.0    Relevant Orders    Iodine, Urine    Iron and TIBC    Ferritin    CBC     Other Visit Diagnoses         Codes    Fatigue, unspecified type     R53.83    Relevant Orders    Vitamin B12    Iron and TIBC    Ferritin    CBC    Vitamin D deficiency     E55.9    Relevant Orders    Vitamin D 25-Hydroxy,Total (for eval of Vitamin D levels)          Continue vitamin d 2000 international units  PO daily.  Obtain winter level to make sure ideal.   Recheck iron, b12 and iodine levels. She seems to be doing much better with her periods- less heavy and her energy.   No constipation from iron.   Continue to eat lots of fiber at meals because fiber helps balance hormone levels.    Get blood work and urine iodine in 2 months.     Recommend Follow up in : 3 months.     Corrie Ponce MD PhD    Time Spent  Prep time on day of patient encounter: 5 minutes  Time spent directly with patient, family or caregiver: 25 minutes  Additional Time Spent on Patient Care Activities: 0 minutes  Documentation Time: 5 minutes  Other Time Spent: 0 minutes  Total: 35 minutes

## 2024-11-04 NOTE — PATIENT INSTRUCTIONS
Try brown lentil tacos as another way to eat more beans.   Get labs late December.   Keep working on fruit three times per day.   Continue to expand servings of vegetables  Continue to strive to eat more beans.   Follow up January 6th at 10:45 am virtual.    Corrie Ponce MD PhD

## 2024-12-26 ENCOUNTER — LAB (OUTPATIENT)
Dept: LAB | Facility: LAB | Age: 39
End: 2024-12-26
Payer: COMMERCIAL

## 2024-12-26 DIAGNOSIS — N92.0 MENORRHAGIA WITH REGULAR CYCLE: ICD-10-CM

## 2024-12-26 DIAGNOSIS — R53.83 FATIGUE, UNSPECIFIED TYPE: ICD-10-CM

## 2024-12-26 DIAGNOSIS — E55.9 VITAMIN D DEFICIENCY: ICD-10-CM

## 2024-12-26 DIAGNOSIS — E04.2 NONTOXIC MULTINODULAR GOITER: ICD-10-CM

## 2024-12-26 LAB
25(OH)D3 SERPL-MCNC: 43 NG/ML (ref 30–100)
ERYTHROCYTE [DISTWIDTH] IN BLOOD BY AUTOMATED COUNT: 12.3 % (ref 11.5–14.5)
FERRITIN SERPL-MCNC: 54 NG/ML (ref 8–150)
HCT VFR BLD AUTO: 41.8 % (ref 36–46)
HGB BLD-MCNC: 14 G/DL (ref 12–16)
IRON SATN MFR SERPL: 39 % (ref 25–45)
IRON SERPL-MCNC: 151 UG/DL (ref 35–150)
MCH RBC QN AUTO: 28.8 PG (ref 26–34)
MCHC RBC AUTO-ENTMCNC: 33.5 G/DL (ref 32–36)
MCV RBC AUTO: 86 FL (ref 80–100)
NRBC BLD-RTO: 0 /100 WBCS (ref 0–0)
PLATELET # BLD AUTO: 203 X10*3/UL (ref 150–450)
RBC # BLD AUTO: 4.86 X10*6/UL (ref 4–5.2)
TIBC SERPL-MCNC: 386 UG/DL (ref 240–445)
UIBC SERPL-MCNC: 235 UG/DL (ref 110–370)
VIT B12 SERPL-MCNC: 843 PG/ML (ref 211–911)
WBC # BLD AUTO: 3.8 X10*3/UL (ref 4.4–11.3)

## 2024-12-26 PROCEDURE — 83018 HEAVY METAL QUAN EACH NES: CPT

## 2024-12-26 PROCEDURE — 36415 COLL VENOUS BLD VENIPUNCTURE: CPT

## 2024-12-26 PROCEDURE — 82728 ASSAY OF FERRITIN: CPT

## 2024-12-26 PROCEDURE — 82306 VITAMIN D 25 HYDROXY: CPT

## 2024-12-26 PROCEDURE — 83540 ASSAY OF IRON: CPT

## 2024-12-26 PROCEDURE — 83550 IRON BINDING TEST: CPT

## 2024-12-26 PROCEDURE — 82607 VITAMIN B-12: CPT

## 2024-12-26 PROCEDURE — 85027 COMPLETE CBC AUTOMATED: CPT

## 2024-12-29 LAB
COLLECT DURATION TIME SPEC: NORMAL HR
CREAT 24H UR-MRATE: NORMAL MG/D (ref 700–1600)
CREAT UR-MCNC: 82 MG/DL
IODINE 24H UR-MCNC: 308.2 UG/L (ref 26–705)
IODINE 24H UR-MRATE: NORMAL MG/(24.H) (ref 93–1125)
IODINE/CREAT UR: 375.9 UG/G CRT (ref 35–540)
SPECIMEN VOL ?TM UR: NORMAL ML

## 2025-01-06 ENCOUNTER — APPOINTMENT (OUTPATIENT)
Dept: INTEGRATIVE MEDICINE | Facility: CLINIC | Age: 40
End: 2025-01-06
Payer: COMMERCIAL

## 2025-01-06 DIAGNOSIS — R53.83 FATIGUE, UNSPECIFIED TYPE: ICD-10-CM

## 2025-01-06 DIAGNOSIS — N92.0 MENORRHAGIA WITH REGULAR CYCLE: Primary | ICD-10-CM

## 2025-01-06 PROCEDURE — 99214 OFFICE O/P EST MOD 30 MIN: CPT | Performed by: INTERNAL MEDICINE

## 2025-01-06 ASSESSMENT — ENCOUNTER SYMPTOMS
FATIGUE: 1
ABDOMINAL PAIN: 0
DIARRHEA: 0
ABDOMINAL DISTENTION: 0
CONSTIPATION: 0

## 2025-01-06 NOTE — ASSESSMENT & PLAN NOTE
Improved. Continue iron. Periods getting lighter with changes to diet perhaps and the vitex may be helping. B12 level is now at high end of normal which may also be helping.   Encouraged her to start strength training. Does enough cardio it sounds like.

## 2025-01-06 NOTE — PROGRESS NOTES
Integrative Medicine Follow-up Visit :     Subjective   Patient ID: Gillian Mcdonald is a 39 y.o. female who presents for Fatigue       Fatigue  Associated symptoms include fatigue. Pertinent negatives include no abdominal pain.     Doing well with exercise and diet.   Fatigue is much better than this summer.  It has improved substantially.   Taking iron 3 x per week. Not constipated. No nauseated.    Periods have become less heavy.  Period was 4 days long and not too heavy. Not too much cramping.   She is taking vitamin d 2000 International Units daily.   Taking the vitamin b12 every day.   Still taking vitex daily.   Still eating ground flax seed. Will put it in yogurt or oatmeal.   Putting it in baking.   Eating salads with chickpeas.   Still working on exercise.  Does the [employer] hayden several days per week.   Or does elliptical. Striving to get 10,000 steps per the day.  Not strength training but has equipment in her home- free weights.   Pain:some cramping with her periods.     Review of Systems   Constitutional:  Positive for fatigue.   Gastrointestinal:  Negative for abdominal distention, abdominal pain, constipation and diarrhea.   Genitourinary:  Positive for menstrual problem.                  Objective   There were no vitals taken for this visit.    Physical Exam  Constitutional:       General: She is not in acute distress.     Appearance: She is not ill-appearing, toxic-appearing or diaphoretic.   Pulmonary:      Effort: Pulmonary effort is normal. No respiratory distress.   Musculoskeletal:         General: No deformity.      Cervical back: Normal range of motion.      Comments: Upper extremities normal range of motion grossly   Skin:     Coloration: Skin is not jaundiced or pale.      Findings: No rash.   Psychiatric:         Mood and Affect: Mood normal.         Behavior: Behavior normal.                      Assessment/Plan     Problem List Items Addressed This Visit             ICD-10-CM     BMI 22.0-22.9, adult Z68.22    Menorrhagia with regular cycle - Primary N92.0    Relevant Orders    CBC and Auto Differential    Iron and TIBC    Ferritin    Fatigue R53.83     Improved. Continue iron. Periods getting lighter with changes to diet perhaps and the vitex may be helping. B12 level is now at high end of normal which may also be helping.   Encouraged her to start strength training. Does enough cardio it sounds like.         Labs reviewed. Iodine improved. Do not want to have it go too high so decrease to five days per week.   Iron better. Continue three days per week.     Try decreasing the iodine to Monday through Friday. (Raquel Camarillo's brand 250 mcg)  Keep taking vitamin b12 daily   Increase vitamin d 2000 international units daily Monday throught Friday and 4000 International Units on the weekends.  Continue the ground flax seed.  Strive to get two weight training classes done per week.   Repeat iron studies and CBC in 6 months.   Follow up 3 months.     Recommend Follow up in : 3 months.     Corrie Ponce MD PhD    Time Spent  Prep time on day of patient encounter: 2 minutes  Time spent directly with patient, family or caregiver: 25 minutes  Additional Time Spent on Patient Care Activities: 0 minutes  Documentation Time: 5 minutes  Other Time Spent: 0 minutes  Total: 32 minutes

## 2025-01-24 ENCOUNTER — OFFICE VISIT (OUTPATIENT)
Dept: URGENT CARE | Age: 40
End: 2025-01-24
Payer: COMMERCIAL

## 2025-01-24 VITALS
BODY MASS INDEX: 21.79 KG/M2 | DIASTOLIC BLOOD PRESSURE: 85 MMHG | RESPIRATION RATE: 17 BRPM | WEIGHT: 123 LBS | TEMPERATURE: 98.3 F | OXYGEN SATURATION: 100 % | SYSTOLIC BLOOD PRESSURE: 129 MMHG | HEART RATE: 98 BPM

## 2025-01-24 DIAGNOSIS — J02.9 SORE THROAT: ICD-10-CM

## 2025-01-24 DIAGNOSIS — Z20.822 CLOSE EXPOSURE TO COVID-19 VIRUS: ICD-10-CM

## 2025-01-24 LAB
POC RAPID INFLUENZA A: POSITIVE
POC RAPID INFLUENZA B: NEGATIVE
POC RAPID STREP: NEGATIVE
POC SARS-COV-2 AG BINAX: NORMAL

## 2025-01-24 PROCEDURE — 87804 INFLUENZA ASSAY W/OPTIC: CPT

## 2025-01-24 PROCEDURE — 87811 SARS-COV-2 COVID19 W/OPTIC: CPT

## 2025-01-24 PROCEDURE — 1036F TOBACCO NON-USER: CPT | Performed by: PHYSICIAN ASSISTANT

## 2025-01-24 PROCEDURE — 87880 STREP A ASSAY W/OPTIC: CPT

## 2025-01-24 PROCEDURE — 99213 OFFICE O/P EST LOW 20 MIN: CPT | Performed by: PHYSICIAN ASSISTANT

## 2025-01-24 NOTE — PATIENT INSTRUCTIONS
Use IBUPROFEN 800MG every 8 hours as needed for flu symptoms.    Drink plenty of water.    Follow up here as needed if any new/worsening symptoms.

## 2025-01-24 NOTE — PROGRESS NOTES
Subjective   Patient ID: Gillian Mcdonald is a 39 y.o. female. They present today with a chief complaint of Sore Throat (Sore throat and sinus pressure x 3 days).    Patient disposition: Home    HISTORY OF PRESENT ILLNESS:    This is an OHF adult presenting for flulike sx for 3d including body aches, sinus pain, cough, chest burning, fatigue. Admits subjective fevers. All sx improving and no fever today. Denies CP, dyspnea.      Past Medical History  Allergies as of 2025    (No Known Allergies)       (Not in a hospital admission)       Past Medical History:   Diagnosis Date    SVT (supraventricular tachycardia) (CMS-HCC)        Past Surgical History:   Procedure Laterality Date    APPENDECTOMY  2015    Appendectomy     SECTION, LOW TRANSVERSE  2015     Section Low Transverse    FOOT SURGERY  2015    Foot Surgery    OTHER SURGICAL HISTORY  2015    Wrist Surgery    THYROID SURGERY  2015    Thyroid Surgery        reports that she has never smoked. She has never used smokeless tobacco. She reports current alcohol use. She reports that she does not use drugs.    Review of Systems    Negative except as documented in the History of Present Illness.                             Objective    Vitals:    25 1337   BP: 129/85   Pulse: 98   Resp: 17   Temp: 36.8 °C (98.3 °F)   SpO2: 100%   Weight: 55.8 kg (123 lb)     No LMP recorded.      PHYSICAL EXAMINATION:    CONSTITUTIONAL: well-appearing, nontoxic         ENT:  Head and face are unremarkable and atraumatic. Mucous membranes moist.    * Oropharynx nl. Airway patent.    * No uvular deviation. No visible abscess.    * Lymphadenopathy absent.    * TMs nl bl.         LUNGS:  CTAB, no r/r/w.    CARDIOVASCULAR:   RRR, no m/r/g. Nl S1/S2.    ABDOMEN:  Nontender including left upper quadrant, nondistended, no acute abdomen.     MUSCULOSKELETAL: No obvious deformities. CURRY with equal strength. Gait normal.    SKIN:    Warm and dry with no rashes.    NEURO:  Normal baseline mental status.    PSYCH: Appropriate mood and affect.         ------------------------------------------         MDM: VSWNL and exam benign. Flu A+. Offered Tamiflu but pt declined. Will continue OTC ibuprofen and will fu here PRN if new/worsening sx.        Procedures    Diagnostic study results (if any) were reviewed by Jaswinder Cage PA-C.    Results for orders placed or performed in visit on 01/24/25   POCT Covid-19 Rapid Antigen   Result Value Ref Range    POC DARLIN-COV-2 AG  Presumptive negative test for SARS-CoV-2 (no antigen detected)     Presumptive negative test for SARS-CoV-2 (no antigen detected)   POCT rapid strep A manually resulted   Result Value Ref Range    POC Rapid Strep Negative Negative   POCT Influenza A/B manually resulted   Result Value Ref Range    POC Rapid Influenza A Positive (A) Negative    POC Rapid Influenza B Negative Negative        Assessment/Plan   Allergies, medications, history, and pertinent labs/EKGs/Imaging reviewed by Jaswinder Cage PA-C.     Orders and Diagnoses  Diagnoses and all orders for this visit:  Close exposure to COVID-19 virus  -     POCT Covid-19 Rapid Antigen  Sore throat  -     POCT rapid strep A manually resulted  -     POCT Influenza A/B manually resulted      Medical Admin Record      Follow Up Instructions  No follow-ups on file.    Electronically signed by Jaswinder Cage PA-C  2:07 PM

## 2025-02-07 ENCOUNTER — OFFICE VISIT (OUTPATIENT)
Dept: FAMILY MEDICINE CLINIC | Age: 40
End: 2025-02-07
Payer: COMMERCIAL

## 2025-02-07 VITALS
OXYGEN SATURATION: 100 % | TEMPERATURE: 98.6 F | BODY MASS INDEX: 21.68 KG/M2 | HEIGHT: 64 IN | HEART RATE: 83 BPM | DIASTOLIC BLOOD PRESSURE: 74 MMHG | WEIGHT: 127 LBS | SYSTOLIC BLOOD PRESSURE: 106 MMHG

## 2025-02-07 DIAGNOSIS — H65.191 ACUTE MIDDLE EAR EFFUSION, RIGHT: Primary | ICD-10-CM

## 2025-02-07 PROBLEM — N97.9 FEMALE INFERTILITY: Status: ACTIVE | Noted: 2024-03-01

## 2025-02-07 PROBLEM — Z90.09 HISTORY OF LOBECTOMY OF THYROID: Status: ACTIVE | Noted: 2023-02-09

## 2025-02-07 PROBLEM — I47.10 SUPRAVENTRICULAR TACHYCARDIA (HCC): Status: ACTIVE | Noted: 2023-12-18

## 2025-02-07 PROCEDURE — 99203 OFFICE O/P NEW LOW 30 MIN: CPT | Performed by: FAMILY MEDICINE

## 2025-02-07 RX ORDER — AZITHROMYCIN 250 MG/1
250 TABLET, FILM COATED ORAL DAILY
COMMUNITY
Start: 2025-02-04

## 2025-02-07 SDOH — ECONOMIC STABILITY: FOOD INSECURITY: WITHIN THE PAST 12 MONTHS, THE FOOD YOU BOUGHT JUST DIDN'T LAST AND YOU DIDN'T HAVE MONEY TO GET MORE.: NEVER TRUE

## 2025-02-07 SDOH — ECONOMIC STABILITY: FOOD INSECURITY: WITHIN THE PAST 12 MONTHS, YOU WORRIED THAT YOUR FOOD WOULD RUN OUT BEFORE YOU GOT MONEY TO BUY MORE.: NEVER TRUE

## 2025-02-07 SDOH — HEALTH STABILITY: PHYSICAL HEALTH: ON AVERAGE, HOW MANY MINUTES DO YOU ENGAGE IN EXERCISE AT THIS LEVEL?: 30 MIN

## 2025-02-07 SDOH — HEALTH STABILITY: PHYSICAL HEALTH: ON AVERAGE, HOW MANY DAYS PER WEEK DO YOU ENGAGE IN MODERATE TO STRENUOUS EXERCISE (LIKE A BRISK WALK)?: 4 DAYS

## 2025-02-07 ASSESSMENT — PATIENT HEALTH QUESTIONNAIRE - PHQ9
SUM OF ALL RESPONSES TO PHQ QUESTIONS 1-9: 0
1. LITTLE INTEREST OR PLEASURE IN DOING THINGS: NOT AT ALL
SUM OF ALL RESPONSES TO PHQ QUESTIONS 1-9: 0
SUM OF ALL RESPONSES TO PHQ QUESTIONS 1-9: 0
2. FEELING DOWN, DEPRESSED OR HOPELESS: NOT AT ALL
SUM OF ALL RESPONSES TO PHQ QUESTIONS 1-9: 0
SUM OF ALL RESPONSES TO PHQ9 QUESTIONS 1 & 2: 0

## 2025-02-07 NOTE — PROGRESS NOTES
[facility]  [address] [place] [id number]  Dept: [phone]  Dept Fax: [phone]     Chief Complaint:  Chief Complaint   Patient presents with    Ear Fullness     Right ear clogged x 1 week. States she had influenza 2 weeks ago, on day 4 of a zpac and it isnt clearing.       Vitals:    02/07/25 1445   BP: 106/74   Pulse: 83   Temp: 98.6 °F (37 °C)   SpO2: 100%   Weight: 57.6 kg (127 lb)   Height: 1.626 m (5' 4\")       HPI:  39 y.o.female who presents for the following:      Unwell feeling: diagnosed with flu 2 weeks ago; after a trip came home and felt nasal congestion; seen by a virtual urgent care 3 days ago and started on azithro along with claritin and flonase for sinusitis; R ear is muffled x 6 days    -----------------------------------------------------------------------------    Assessment/Plan:  39 y.o. female here mainly for the following:  R ear problem  Sterile effusion on the R; TM clear otherwise  Can continue the flonase, claritin, and decongestant  Likely to resolve in 1-2mo  She plans to see ENT in a couple weeks for this        ICD-10-CM    1. Acute middle ear effusion, right  H65.191           Return if symptoms worsen or fail to improve.    Richard Gerard MD      -----------------------------------------------------------------------------      Objective     Physical Exam:  Physical Exam  Vitals reviewed.   Constitutional:       General: She is not in acute distress.     Appearance: She is well-developed.   HENT:      Head: Normocephalic and atraumatic.      Right Ear: Ear canal and external ear normal. A middle ear effusion is present.      Left Ear: Tympanic membrane, ear canal and external ear normal.   Cardiovascular:      Rate and Rhythm: Normal rate.   Pulmonary:      Effort: Pulmonary effort is normal. No respiratory distress.   Musculoskeletal:      Cervical back: Normal range of motion.   Skin:     General: Skin is warm and dry.   Neurological:

## 2025-03-04 ENCOUNTER — APPOINTMENT (OUTPATIENT)
Dept: CARDIOLOGY | Facility: CLINIC | Age: 40
End: 2025-03-04
Payer: COMMERCIAL

## 2025-03-04 VITALS
BODY MASS INDEX: 22.5 KG/M2 | DIASTOLIC BLOOD PRESSURE: 70 MMHG | WEIGHT: 127 LBS | SYSTOLIC BLOOD PRESSURE: 110 MMHG | HEART RATE: 71 BPM | HEIGHT: 63 IN

## 2025-03-04 DIAGNOSIS — I47.10 SUPRAVENTRICULAR TACHYCARDIA (CMS-HCC): Primary | ICD-10-CM

## 2025-03-04 DIAGNOSIS — Z78.9 NEVER SMOKED CIGARETTES: ICD-10-CM

## 2025-03-04 DIAGNOSIS — Z71.89 ENCOUNTER FOR MEDICATION REVIEW AND COUNSELING: ICD-10-CM

## 2025-03-04 DIAGNOSIS — Z71.89 ENCOUNTER TO DISCUSS TREATMENT OPTIONS: ICD-10-CM

## 2025-03-04 DIAGNOSIS — I47.11 INAPPROPRIATE SINUS TACHYCARDIA (CMS-HCC): ICD-10-CM

## 2025-03-04 PROCEDURE — 3008F BODY MASS INDEX DOCD: CPT | Performed by: INTERNAL MEDICINE

## 2025-03-04 PROCEDURE — 93000 ELECTROCARDIOGRAM COMPLETE: CPT | Performed by: INTERNAL MEDICINE

## 2025-03-04 PROCEDURE — 99214 OFFICE O/P EST MOD 30 MIN: CPT | Performed by: INTERNAL MEDICINE

## 2025-03-04 PROCEDURE — 1036F TOBACCO NON-USER: CPT | Performed by: INTERNAL MEDICINE

## 2025-03-04 ASSESSMENT — ENCOUNTER SYMPTOMS
PALPITATIONS: 0
DYSPNEA ON EXERTION: 0

## 2025-03-04 NOTE — PROGRESS NOTES
"Chief Complaint:   Follow-up (Pt is here today following up after  1 year, Supraventricular tachycardia)     History Of Present Illness:    Gillian Mcdonald is a 39 y.o. female presenting with followup.    She had influenza A and had supportive care. She avoids OTC medications that can trigger tachycardia.     Patient denies any arrhythmia symptoms of palpitation, lightheadedness, near syncope, or syncope.    Last Recorded Vitals:  Vitals:    03/04/25 1527   BP: 110/70   BP Location: Left arm   Patient Position: Sitting   Pulse: 71   Weight: 57.6 kg (127 lb)   Height: 1.6 m (5' 3\")       Past Medical History:  See List    Past Surgical History:  See List       Social History:  She reports that she has never smoked. She has never used smokeless tobacco. She reports current alcohol use. She reports that she does not use drugs.    Family History:  Family History   Problem Relation Name Age of Onset    Hypertension Mother      Hypertension Maternal Grandfather      Other (atrial septal defect) Son          Allergies:  Patient has no known allergies.    Outpatient Medications:  Current Outpatient Medications   Medication Instructions    multivitamin tablet 1 tablet, Daily    tranexamic acid (LYSTEDA) 1,300 mg, 3 times daily   Review of Systems   Cardiovascular:  Negative for chest pain, dyspnea on exertion and palpitations.   All other systems reviewed and are negative.        Physical Exam:  Constitutional:       General: Awake.      Appearance: Normal and healthy appearance. Not in distress.   Neck:      Vascular: No JVR. JVD normal.   Pulmonary:      Effort: Pulmonary effort is normal.      Breath sounds: Normal breath sounds. No wheezing. No rhonchi. No rales.   Chest:      Chest wall: Not tender to palpatation.   Cardiovascular:      PMI at left midclavicular line. Normal rate. Regular rhythm. Normal S1. Normal S2.       Murmurs: There is no murmur.      No gallop.  No click. No rub.   Pulses:     Intact " "distal pulses.   Edema:     Peripheral edema absent.   Abdominal:      Tenderness: There is no abdominal tenderness.   Musculoskeletal: Normal range of motion.         General: No tenderness. Skin:     General: Skin is warm and dry.   Neurological:      General: No focal deficit present.      Mental Status: Alert and oriented to person, place and time.            Last Labs:  CBC -  Lab Results   Component Value Date    WBC 3.8 (L) 12/26/2024    HGB 14.0 12/26/2024    HCT 41.8 12/26/2024    MCV 86 12/26/2024     12/26/2024       CMP -  No results found for: \"CALCIUM\", \"PHOS\", \"PROT\", \"ALBUMIN\", \"AST\", \"ALT\", \"ALKPHOS\", \"BILITOT\"    LIPID PANEL -   No results found for: \"CHOL\", \"TRIG\", \"HDL\", \"CHHDL\", \"LDLF\", \"VLDL\", \"NHDL\"    RENAL FUNCTION PANEL -   No results found for: \"GLUCOSE\", \"NA\", \"K\", \"CL\", \"CO2\", \"ANIONGAP\", \"BUN\", \"CREATININE\", \"GFRMALE\", \"CALCIUM\", \"PHOS\", \"ALBUMIN\"     No results found for: \"BNP\", \"HGBA1C\"    Last Cardiology Tests:  ECG:  ECG 12 lead (Clinic Performed) 03/01/2024    Today. NSR. Normal axis. Qtc 390 ms.    Lab review: I have personally reviewed the laboratory result(s) see above    Assessment/Plan   Diagnoses and all orders for this visit:  Supraventricular tachycardia (CMS-HCC)  Inappropriate sinus tachycardia (CMS-HCC)  BMI 22.0-22.9, adult  Encounter for medication review and counseling  Encounter to discuss treatment options  Never smoked cigarettes        Yue Trujillo RN    I, , personally performed the services described in the documentation as scribed by the nurse in my presence, and confirm it is both accurate and complete.     Inappropriate sinus tachycardia. Chronic. Stable. Improved with lifestyle modifications. No chronic medications needed. If recurrent symptoms, then event monitor.  History of exertional dyspnea. Resolved. Normal LV function and stress echo in remote past. Normal MRI. No further EP evaluation indicated presently.  s/p partial " thyroidectomy  No caffeine intake  FMH of ASD s/p patch repair, CAD, and AF. No new family medical history  AHA recommendations for exercise, diet, and behavioral modification reviewed with pt.     Counseling over 50% visit  performed.  The patient and I discussed tachycardia,ECG,  aerobic training, indications for and if refills of medications needed, continue with increased fluid intake, avoid dehydration, treatment options, risks, benefits, and imponderables. American Heart Association lifestyle changes and behavioral modification discussed. All questions answered in detail. Patient appreciative of care.

## 2025-03-04 NOTE — PATIENT INSTRUCTIONS
Continue same medications/treatment.  Patient educated on proper medication use.  Patient educated on risk factor modification.  Please bring any lab results from other providers/physicians to your next appointment.    Please bring all medicines, vitamins, and herbal supplements with you when you come to the office.    Prescriptions will not be filled unless you are compliant with your follow up appointments or have a follow up appointment scheduled as per instruction of your physician. Refills should be requested at the time of your visit.    Follow up with Dr. Guerra in 1 year    I, ADELINA RUIZ RN, AM SCRIBING FOR AND IN THE PRESENCE OF DR. CHAR GUERRA MD, FACC, FACP, RS

## 2025-04-07 ENCOUNTER — APPOINTMENT (OUTPATIENT)
Dept: INTEGRATIVE MEDICINE | Facility: CLINIC | Age: 40
End: 2025-04-07
Payer: COMMERCIAL

## 2025-04-07 DIAGNOSIS — N92.0 MENORRHAGIA WITH REGULAR CYCLE: Primary | ICD-10-CM

## 2025-04-07 DIAGNOSIS — R53.82 CHRONIC FATIGUE: ICD-10-CM

## 2025-04-07 PROCEDURE — 99214 OFFICE O/P EST MOD 30 MIN: CPT | Performed by: INTERNAL MEDICINE

## 2025-04-07 ASSESSMENT — ENCOUNTER SYMPTOMS
DIARRHEA: 0
CONSTIPATION: 0
ABDOMINAL DISTENTION: 0
ABDOMINAL PAIN: 0
FATIGUE: 1

## 2025-04-07 NOTE — PROGRESS NOTES
Integrative Medicine Follow-up Visit :     Subjective   Patient ID: Gillian Mcdonald is a 40 y.o. female who presents for Fatigue and Menorrhagia       HPI  Menstrual cycles have improved. No clotting. Had a small headache only.      Doing strength training 3x per week. 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 Doing 20 minute classes on the [place].     Still taking iron three times per week. Not constipated.   Energy is good. Not as tired.     Remembering to eat ground flax seed. Puts ground flax seed in her yogurt. She is eating beans with tacos.   She is eating soy yogurt three times per week.   Taking chaste tree berry daily.     Trying to do more peloton classes on her bike.   Gets a headache before her period still. Had to see neurologist when she was a kid.   She thinks she is getting enough sleep.   Drinks tea in am and not coffee.     Pain:n/a    Review of Systems   Constitutional:  Positive for fatigue.   Gastrointestinal:  Negative for abdominal distention, abdominal pain, constipation and diarrhea.   Genitourinary:  Positive for menstrual problem.                  Objective   There were no vitals taken for this visit.    Physical Exam  Constitutional:       General: She is not in acute distress.     Appearance: She is not ill-appearing, toxic-appearing or diaphoretic.   Pulmonary:      Effort: Pulmonary effort is normal. No respiratory distress.   Musculoskeletal:         General: No deformity.      Cervical back: Normal range of motion.      Comments: Upper extremities normal range of motion grossly   Skin:     Coloration: Skin is not jaundiced or pale.      Findings: No rash.   Psychiatric:         Mood and Affect: Mood normal.         Behavior: Behavior normal.                      Assessment/Plan     Problem List Items Addressed This Visit             ICD-10-CM    Menorrhagia with regular cycle - Primary N92.0     Improved.          Relevant Orders    CBC and Auto Differential    Iron and TIBC    Ferritin     Fatigue R53.83     Improved/ resolved.  Continue lifestyle changes we have been discussing. Keep taking iron supplements.         For occ headaches- lets see if even more fiber could help modulate hormone changes around her period and help prevent headaches.   Cotinue ground flax seed.   Minimalist baker for recipes that incorporate beans.  Substitute one breakfast of eggs with some more overnight oats.   Continue the sprint interval training.   Read the food as medicine guide and work on getting up to 30 grams of fiber per day.   Get your iron studies before your next appointment.   Continue strength training 3 times per week.     Recommend Follow up in : 3 months.     Corrie Ponce MD PhD    Time Spent  Prep time on day of patient encounter: 3 minutes  Time spent directly with patient, family or caregiver: 25 minutes  Additional Time Spent on Patient Care Activities: 0 minutes  Documentation Time: 5 minutes  Other Time Spent: 0 minutes  Total: 33 minutes

## 2025-04-07 NOTE — PATIENT INSTRUCTIONS
Minimalist baker for recipes that incorporate beans.  Substitute one breakfast of eggs with some more overnight oats.   Continue the sprint interval training.   Read the food as medicine guide and work on getting up to 30 grams of fiber per day.   Get your iron studies before your next appointment.   Continue strength training 3 times per week.   Corrie Ponce MD PhD

## 2025-04-07 NOTE — ASSESSMENT & PLAN NOTE
Improved/ resolved.  Continue lifestyle changes we have been discussing. Keep taking iron supplements.

## 2025-07-29 LAB
BASOPHILS # BLD AUTO: 39 CELLS/UL (ref 0–200)
BASOPHILS NFR BLD AUTO: 0.8 %
EOSINOPHIL # BLD AUTO: 59 CELLS/UL (ref 15–500)
EOSINOPHIL NFR BLD AUTO: 1.2 %
ERYTHROCYTE [DISTWIDTH] IN BLOOD BY AUTOMATED COUNT: 12.4 % (ref 11–15)
FERRITIN SERPL-MCNC: 32 NG/ML (ref 16–154)
HCT VFR BLD AUTO: 41.3 % (ref 35–45)
HGB BLD-MCNC: 13.4 G/DL (ref 11.7–15.5)
IRON SATN MFR SERPL: 39 % (CALC) (ref 16–45)
IRON SERPL-MCNC: 155 MCG/DL (ref 40–190)
LYMPHOCYTES # BLD AUTO: 1617 CELLS/UL (ref 850–3900)
LYMPHOCYTES NFR BLD AUTO: 33 %
MCH RBC QN AUTO: 28.8 PG (ref 27–33)
MCHC RBC AUTO-ENTMCNC: 32.4 G/DL (ref 32–36)
MCV RBC AUTO: 88.8 FL (ref 80–100)
MONOCYTES # BLD AUTO: 392 CELLS/UL (ref 200–950)
MONOCYTES NFR BLD AUTO: 8 %
NEUTROPHILS # BLD AUTO: 2793 CELLS/UL (ref 1500–7800)
NEUTROPHILS NFR BLD AUTO: 57 %
PLATELET # BLD AUTO: 205 THOUSAND/UL (ref 140–400)
PMV BLD REES-ECKER: 11.9 FL (ref 7.5–12.5)
RBC # BLD AUTO: 4.65 MILLION/UL (ref 3.8–5.1)
TIBC SERPL-MCNC: 399 MCG/DL (CALC) (ref 250–450)
WBC # BLD AUTO: 4.9 THOUSAND/UL (ref 3.8–10.8)

## 2026-03-10 ENCOUNTER — APPOINTMENT (OUTPATIENT)
Dept: CARDIOLOGY | Facility: CLINIC | Age: 41
End: 2026-03-10
Payer: COMMERCIAL